# Patient Record
Sex: FEMALE | Race: BLACK OR AFRICAN AMERICAN | NOT HISPANIC OR LATINO | Employment: OTHER | URBAN - METROPOLITAN AREA
[De-identification: names, ages, dates, MRNs, and addresses within clinical notes are randomized per-mention and may not be internally consistent; named-entity substitution may affect disease eponyms.]

---

## 2017-01-24 ENCOUNTER — TELEPHONE (OUTPATIENT)
Dept: OBGYN | Facility: CLINIC | Age: 32
End: 2017-01-24

## 2017-01-24 ENCOUNTER — MEDICAL CORRESPONDENCE (OUTPATIENT)
Dept: HEALTH INFORMATION MANAGEMENT | Facility: CLINIC | Age: 32
End: 2017-01-24

## 2017-01-24 NOTE — TELEPHONE ENCOUNTER
Dr. Agbeh signed and faxed orders for home visits on 01/24/2017, to [facility] at # [phone].  Josseline Puri CMA

## 2018-03-09 ENCOUNTER — OFFICE VISIT (OUTPATIENT)
Dept: FAMILY MEDICINE | Facility: CLINIC | Age: 33
End: 2018-03-09
Payer: COMMERCIAL

## 2018-03-09 VITALS
SYSTOLIC BLOOD PRESSURE: 120 MMHG | OXYGEN SATURATION: 97 % | WEIGHT: 124.8 LBS | TEMPERATURE: 98.2 F | HEART RATE: 76 BPM | DIASTOLIC BLOOD PRESSURE: 87 MMHG | BODY MASS INDEX: 19.55 KG/M2

## 2018-03-09 DIAGNOSIS — F43.22 ADJUSTMENT DISORDER WITH ANXIOUS MOOD: Primary | ICD-10-CM

## 2018-03-09 DIAGNOSIS — K08.89 TOOTH PAIN: ICD-10-CM

## 2018-03-09 RX ORDER — IBUPROFEN 600 MG/1
600 TABLET, FILM COATED ORAL EVERY 6 HOURS PRN
Qty: 30 TABLET | Refills: 3 | Status: SHIPPED | OUTPATIENT
Start: 2018-03-09 | End: 2023-09-25

## 2018-03-09 ASSESSMENT — ENCOUNTER SYMPTOMS
SLEEP DISTURBANCE: 1
FEVER: 0
DYSPHORIC MOOD: 1
APPETITE CHANGE: 1
NERVOUS/ANXIOUS: 1

## 2018-03-09 NOTE — PATIENT INSTRUCTIONS
Here is the plan from today's visit    Adjustment disorder with anxious mood  We will call you to schedule behavioral health assessment.    We will get you Sugar's information.  - BEHAVIORAL HEALTH REFERRAL (formerly [facility] interal and external)      Please call or return to clinic if your symptoms don't go away.    Follow up plan: with Dr. Bucio in 2 weeks       Thank you for coming to [facility] today.  Lab Testing:  **If you had lab testing today and your results are reassuring or normal they will be mailed to you or sent through [employer] within 7 days.   **If the lab tests need quick action we will call you with the results.  The phone number we will call with results is # [phone] (home) . If this is not the best number please call our clinic and change the number.  Medication Refills:  If you need any refills please call your pharmacy and they will contact us.   If you need to  your refill at a new pharmacy, please contact the new pharmacy directly. The new pharmacy will help you get your medications transferred faster.   Scheduling:  If you have any concerns about today's visit or wish to schedule another appointment please call our office during normal business hours [phone] (8-5:00 M-F)  If a referral was made to a [facility] and you don't get a call from central scheduling please call [phone].  If a Mammogram was ordered for you at [facility] call [phone] to schedule or change your appointment.  If you had an XRay/CT/Ultrasound/MRI ordered the number is [phone] to schedule or change your radiology appointment.   Medical Concerns:  If you have urgent medical concerns please call [phone] at any time of the day.  If you have a medical emergency please call [phone].
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
## 2018-03-09 NOTE — MR AVS SNAPSHOT
After Visit Summary   3/9/2018    Chelsi Saleh    MRN: [id number]           Patient Information     Date Of Birth          1985        Visit Information        Provider Department      3/9/2018 2:20 PM Fuad Bucio,  [facility]        Today's Diagnoses     Adjustment disorder with anxious mood    -  1    Tooth pain          Care Instructions    Here is the plan from today's visit    Adjustment disorder with anxious mood  We will call you to schedule behavioral health assessment.    We will get you Sugar's information.  - BEHAVIORAL HEALTH REFERRAL ([facility]ospit[facility] interal and external)      Please call or return to clinic if your symptoms don't go away.    Follow up plan: with Dr. Bucio in 2 weeks       Thank you for coming to [facility] today.  Lab Testing:  **If you had lab testing today and your results are reassuring or normal they will be mailed to you or sent through [employer] within 7 days.   **If the lab tests need quick action we will call you with the results.  The phone number we will call with results is # [phone] (home) . If this is not the best number please call our clinic and change the number.  Medication Refills:  If you need any refills please call your pharmacy and they will contact us.   If you need to  your refill at a new pharmacy, please contact the new pharmacy directly. The new pharmacy will help you get your medications transferred faster.   Scheduling:  If you have any concerns about today's visit or wish to schedule another appointment please call our office during normal business hours [phone] (8-5:00 M-F)  If a referral was made to a [facility] and you don't get a call from central scheduling please call [phone].  If a Mammogram was ordered for you at [facility] call [phone] to schedule or change your appointment.  If you had an XRay/CT/Ultrasound/MRI ordered the number is  [phone] to schedule or change your radiology appointment.   Medical Concerns:  If you have urgent medical concerns please call [phone] at any time of the day.  If you have a medical emergency please call 911.            Follow-ups after your visit        Additional Services     BEHAVIORAL HEALTH REFERRAL ([facility] interal and external)       Referring MD: DONNY YODER    May leave message on voicemail: Yes                  Who to contact     Please call your clinic at [phone] to:    Ask questions about your health    Make or cancel appointments    Discuss your medicines    Learn about your test results    Speak to your doctor            Additional Information About Your Visit        [employer] Information     [employer] is an electronic gateway that provides easy, online access to your medical records. With [employer], you can request a clinic appointment, read your test results, renew a prescription or communicate with your care team.     To sign up for [employer] visit the website at [email or web address]   You will be asked to enter the access code listed below, as well as some personal information. Please follow the directions to create your username and password.     Your access code is: [email or web address]  Expires: 2018  3:03 PM     Your access code will  in 90 days. If you need help or a new code, please contact your [facility] or call [phone] for assistance.        Care EveryWhere ID     This is your Care EveryWhere ID. This could be used by other organizations to access your [place] medical records  [id number]        Your Vitals Were     Pulse Temperature Pulse Oximetry Breastfeeding? BMI (Body Mass Index)       76 98.2  F (36.8  C) (Oral) 97% No 19.55 kg/m2        Blood Pressure from Last 3 Encounters:   18 120/87   16 105/77   10/19/16 114/85    Weight from Last 3 Encounters:   18 124 lb 12.8 oz (56.6 kg)   16 132 lb  (59.9 kg)   10/19/16 129 lb (58.5 kg)              We Performed the Following     BEHAVIORAL HEALTH REFERRAL ([facility] interal and external)          Today's Medication Changes          These changes are accurate as of 3/9/18  3:08 PM.  If you have any questions, ask your nurse or doctor.               These medicines have changed or have updated prescriptions.        Dose/Directions    * IBUPROFEN PO   This may have changed:  Another medication with the same name was added. Make sure you understand how and when to take each.   Changed by:  Fuad Bucio DO        Dose:  600 mg   Take 600 mg by mouth every 6 hours as needed for moderate pain   Refills:  0       * ibuprofen 600 MG tablet   Commonly known as:  ADVIL/MOTRIN   This may have changed:  You were already taking a medication with the same name, and this prescription was added. Make sure you understand how and when to take each.   Used for:  Tooth pain   Changed by:  Fuad Bucio DO        Dose:  600 mg   Take 1 tablet (600 mg) by mouth every 6 hours as needed for moderate pain   Quantity:  30 tablet   Refills:  3       * Notice:  This list has 2 medication(s) that are the same as other medications prescribed for you. Read the directions carefully, and ask your doctor or other care provider to review them with you.         Where to get your medicines      These medications were sent to [facility] [place], [place] - [address] [facility] [address]     Phone:  [phone]     ibuprofen 600 MG tablet                Primary Care Provider Fax #    Physician No Ref-Primary [phone]       No address on file        Equal Access to Services     [facility] Hospital[facility]TON[facility] : Hadii flavio zamorao Sokarlie, waaxda luqadaha, qaybta kaalmada adecody, stacy vela . So [facility] [phone].    ATENCIÓN: Si habla español, tiene a bonner disposición servicios gratuitos de asistencia lingüística. Llame  al [phone].    We comply with applicable federal civil rights laws and [place] laws. We do not discriminate on the basis of race, color, national origin, age, disability, sex, sexual orientation, or gender identity.            Thank you!     Thank you for choosing [facility]  for your care. Our goal is always to provide you with excellent care. Hearing back from our patients is one way we can continue to improve our services. Please take a few minutes to complete the written survey that you may receive in the mail after your visit with us. Thank you!             Your Updated Medication List - Protect others around you: Learn how to safely use, store and throw away your medicines at www.disposemymeds.org.          This list is accurate as of 3/9/18  3:08 PM.  Always use your most recent med list.                   Brand Name Dispense Instructions for use Diagnosis    acetaminophen-codeine 300-30 MG per tablet    TYLENOL #3     Take 1-2 tablets by mouth every 4 hours as needed for moderate pain        AMOXICILLIN PO      Take 500 mg by mouth 3 times daily        * IBUPROFEN PO      Take 600 mg by mouth every 6 hours as needed for moderate pain        * ibuprofen 600 MG tablet    ADVIL/MOTRIN    30 tablet    Take 1 tablet (600 mg) by mouth every 6 hours as needed for moderate pain    Tooth pain       * Notice:  This list has 2 medication(s) that are the same as other medications prescribed for you. Read the directions carefully, and ask your doctor or other care provider to review them with you.

## 2018-03-09 NOTE — PROGRESS NOTES
Preceptor Attestation:   Patient seen and discussed with the resident. Assessment and plan reviewed with resident and agreed upon.   Supervising Physician:  Phillip Malin MD  [facility]
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
## 2018-03-13 ENCOUNTER — TELEPHONE (OUTPATIENT)
Dept: PSYCHOLOGY | Facility: CLINIC | Age: 33
End: 2018-03-13

## 2018-03-13 NOTE — TELEPHONE ENCOUNTER
Mental Health Referral:  Please schedule with Dr. Doe.      If you are unable to reach the patient after two phone attempts, please send a letter and close the encounter.      Thank you!      Nathaniel Lombardi, PhD,   Behavioral Health Fellow

## 2018-03-16 NOTE — TELEPHONE ENCOUNTER
It looks like this patient was referred to [facility] as well.  Thank you

## 2018-03-16 NOTE — PROGRESS NOTES
Preceptor Attestation:  I have reviewed and agree with the behavioral health fellow's documentation for this visit.  I did not see the patient.  Supervising Clinical Psychologist:  Dulce Dorsey PSYD LP

## 2018-03-22 ENCOUNTER — DOCUMENTATION ONLY (OUTPATIENT)
Dept: FAMILY MEDICINE | Facility: CLINIC | Age: 33
End: 2018-03-22

## 2018-03-22 NOTE — PROGRESS NOTES
Behavioral Health Home Services  No Data Recorded      Social Work Care Navigator Note      Patient: Chelsi Saleh  Date: March 22, 2018  Preferred Name: Chelsi    Previous PHQ-9: No flowsheet data found.  Previous STEVEN-7: No flowsheet data found.  SANDRA LEVEL:  No flowsheet data found.    Preferred Contact:  No Data Recorded    Type of Contact Today: Phone call (not reached/unavailable)      Data: (subjective / Objective):  Referral for [facility] services, will contact patient upon completion of DA and meeting eligibility criteria for the [facility] service.  Attempted to reach patient, but was unsuccessful.  Plan to attempt again.  JULIENNE Chang [facility] SW CC

## 2018-03-28 ENCOUNTER — CARE COORDINATION (OUTPATIENT)
Dept: FAMILY MEDICINE | Facility: CLINIC | Age: 33
End: 2018-03-28

## 2018-03-28 ENCOUNTER — OFFICE VISIT (OUTPATIENT)
Dept: PSYCHOLOGY | Facility: CLINIC | Age: 33
End: 2018-03-28
Payer: COMMERCIAL

## 2018-03-28 DIAGNOSIS — F32.A DEPRESSION, UNSPECIFIED DEPRESSION TYPE: Primary | ICD-10-CM
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
## 2018-03-28 NOTE — PROGRESS NOTES
"Behavioral Health Progress Note    Client Legal Name: Chelsi Saleh   Client Preferred Name: Chelsi   Service Type: Crisis Psychotherapy Visit  Length of Visit: 90 minutes  Attendees: Chelsi's 18 month old son-Jerrell, and Community Health Worker (who also assisted with some interpretation at times, Sugarleyla Saleh.    Complexity: An  is used not only to interpret language, since the patient does not speak English, but also to help with the complexity of understandings across cultures, since the patient is not well integrated in the larger [place] culture.    Identifying Information and Presenting Problem:  The patient is a 32 year old [place] female who is being seen for problematic symptoms of anxiety.    Crisis Intervention:  Threat to Safety: Chelsi came into the session and reported that her ex-, his new wife, and a girl friend of his have been calling her and leaving voicemails that they are going to find her and kill her and her son. Chelsi stated that she did not feel safe and she felt that her life and her son's life were in danger. She stated that she has been getting threats for months, but more recently they have been asking for her address, which scared her. Chelsi stated that she has not given them her address, but she fears they will find her due to how small the [place] community is. Chelsi reported that she and her ex were never legally  and that they were \"culturally .\" She reported that they are mad at her because her ex pays child support and they no longer want to.    Current Living: Korey is currently living in a government assisted apartment with her son. She stated that her ex has never been there and he currently does not know where she lives.    Police Report: Due to the nature of the threats against Chelsi and her son, I consulted [facility]alt[facility] Gonz[facility]les, Clinic , to assist with the case. Ms. Gonzáles called the police to ask that they come to the clinic and take a " statement from Chelsi. After an hour, the police arrived and talked with the patient in my presence. The [place]  who took Chelsi's statement was Andres Nguyen badge number [id number]. Through clarification, Chelsi reported that her ex was living in [place]. Officer Patrick reported that, since her ex was not in state, the threat was not imminent and that he could not do much. Officer Patrick recommended that Chelsi 1) Block her ex's and his partner's phone numbers and 2) Chelsi go to the [facility] to speak with an advocate to file an order of protection.    Women's Shelters: Before learning that Chelsi's ex was in [place], she had stated that he was in the [facility] and that she was concerned he would find her. Therefore, she requested information on shelters and I contacted multiple shelters to see if they had capacity to take Chelsi and her son. I first talked with the [facility] shelter at [phone] and they informed me that they did not have any space, but a shelter in [place] did. I called the shelter in [place] at [phone] and they reported that they actually did not have the space. Therefore, I called [facility] in [place] and they stated that they were full and to call the [place] shelter. I informed [facility] that [place] was actually full. I then called the [facility] at [phone] and Chelsi took the phone and talked with them. Chelsi was mid conversation with them when officer Patrick arrived. Chelsi asked to call them back. Following her discussion with Officer Patrick, she declined wanting to go to a shelter today, as her ex was not in state.    Mental Status: Chelsi appeared generally alert and oriented. Dress was casual and appropriate to the weather and occasion. Grooming and hygiene were good. Eye contact was good. Speech was of normal volume and rate and was clear, coherent, and relevant. Mood was anxious with congruent affect. She  appeared to be in quite a bit of distress; however, at the end of the appointment she appeared much calmer, which was consistent with her verbal reports. Thought processes were relevant, logical and goal-directed. Thought content was WNL with no evidence of psychotic or paranoid features. No evidence of SI/HI or self-harm, intent, or plans. Memory appeared grossly intact. Insight and judgment appeared adequate and patient exhibited good impulse control during the appointment.     Does the patient appear to be at imminent risk of harm to self/others at this time? No    The session was necessary to address Chelsi's current crisis of safety that has been causing her significant distress and ability to function. Ongoing psychotherapy is necessary to provide counseling and provide support.    Diagnosis (DSM-5):  Unspecified Depression    Plan:  1. Please see Patient Instructions for specific plan developed with Chelsi. Her plan is to go the [facility], ask to see an advocate to file an order of protection, and then file an order of protection. Then, if/when her  or his partners contact Chelsi after the order of protection is filed, she is to call the police to notify them of their contact. Chelsi was also given the list of all the shelters for her to contact if she feels going to the shelter is needed. I typed up, printed, and reviewed the plan in-depth with Chelsi and highlighted many important parts. She verbalized understanding and the rationale for the plan. She denied having any questions.  2. Chelsi to follow up in 1 to 2 weeks. I will place a call between now and our next appointment to check in with Chelsi.  3. I consulted my supervisor Dr. Dorsey during and after my visit with Chelsi for the purpose of care coordination.  4. Both Sugar Saleh and Katlin Gonzáles were involved in today's visit and were consulted during and after for the purpose of care coordination.

## 2018-03-28 NOTE — PATIENT INSTRUCTIONS
Go to [facility]  [address]  [facility]  [phone]    Request to talk to an advocate to file an Order of Protection    - Bring evidence of the threats    Officer from today  - Andres Mora [id number]      Step 1: File Order of Protection  Step 2: If ex- and wife contact you after that order if filed, you notify police of this contact        If feel unsafe in home:    Call [facility] Crisis Line [phone]    Other options:  The National Domestic Violence Hotline  7-288-919-SAFE  www.[employer]    [facility]    Services offered:    24 hour shelter and crisis lines    Orders for Protection    Safety Planning    Assistance with: healthcare, job placement, childcare, housing    Transportation to the shelter    846.478.3785 or 1-422.877.2191  www.[employer].org    [facility]     For women, men, youth and families who have experienced relationship violence, elder abuse, addiction, sexual exploitation or other forms of trauma. Safe shelter, legal services, mental and chemical health counseling, elder abuse resources, youth programming and community education.    [facility] Crisis line: [phone]  (several locations):    [facility]  [address] [place], [place] [address] [phone]    [facility]  [address] [place], [place] [address] [phone]    [facility]   [address] [place] [address]  [phone]    [place] [facility]  [address]  [place] [address]  Hotline: 1 (394) 514-9789  Office: [phone] Fax: [phone]  Website: www.[facility].org    [place] [facility] - (Domestic violence, sexual assault, human trafficking, youth and community advocacy services)  A centralized referral source to connect survivors with services/agencies in their geographic region.     763.678.2420 or  0-919-748-5232  [facility]org      [facility]  Youth and adult safety planning, advocacy, rape and sexual abuse services, senior women's advocacy, legal systems advocacy, support groups, parenting support, housing resources, help with restraining orders, housing resources    [address] [address]  Office: [phone]  24-Hour Crisis Line: [phone]    [facility]  [address] [place] [address]  Office: [phone]  24-Hour Crisis Line: [phone]    [facility]  [address] [place] [address]  Office: [phone]  Office Fax: [phone]  24-Hour Crisis Line: [phone]    Order for Protection Filing  [facility]: [phone]  [facility]: [phone]    Sexual Offense Services  [facility]: [phone]  [facility]: [phone]      Referral (per [facility]  [facility]: [phone]  [facility]: [phone]    [facility]  [phone]    [facility] Toll Free Crisis Line  [phone]

## 2018-03-28 NOTE — MR AVS SNAPSHOT
After Visit Summary   3/28/2018    Chelsi Saleh    MRN: [id number]           Patient Information     Date Of Birth          1985        Visit Information        Provider Department      3/28/2018 10:20 AM Gwen Doe, PhD [facility]        Today's Diagnoses     Depression, unspecified depression type    -  1      Care Instructions    Go to [facility]  [address]  [facility]  [phone]    Request to talk to an advocate to file an Order of Protection    - Bring evidence of the threats    Officer from today  - Andres Mora 5387      Step 1: File Order of Protection  Step 2: If ex- and wife contact you after that order if filed, you notify police of this contact        If feel unsafe in home:    Call [facility] Crisis Line [phone]    Other options:  The National Domestic Violence Hotline  7-577-585-WSOL  www.[employer]    [facility] [employer]    Services offered:    24 hour shelter and crisis lines    Orders for Protection    Safety Planning    Assistance with: healthcare, job placement, childcare, housing    Transportation to the shelter    841.155.6062 or 1-625.705.1324  www.[employer].org    [facility]     For women, men, youth and families who have experienced relationship violence, elder abuse, addiction, sexual exploitation or other forms of trauma. Safe shelter, legal services, mental and chemical health counseling, elder abuse resources, youth programming and community education.    [facility] Crisis line: [phone]  (several locations):    [facility]  [address] [place] [address]  [phone]    [facility]  [address] [place] [address]  [phone]    [facility]   [address] [place] [address]  [phone]    [place] [facility]  [address]  [place] [address]  Hotline: 1 (716) 306-1710  Office: [phone] Fax: [phone]  Website: www.[facility].org    [place] [facility] - (Domestic violence, sexual assault, human trafficking, youth and community advocacy services)  A centralized referral source to connect survivors with services/agencies in their geographic region.     822.763.4194 or 1-926.209.9985  [facility].org      [facility]  Youth and adult safety planning, advocacy, rape and sexual abuse services, senior women's advocacy, legal systems advocacy, support groups, parenting support, housing resources, help with restraining orders, housing resources    [address] [address]  Office: [phone]  24-Hour Crisis Line: [phone]    [facility]  [address] [place] [address]  Office: [phone]  24-Hour Crisis Line: [phone]    [facility]  [address] [place] [address]  Office: [phone]  Office Fax: [phone]  24-Hour Crisis Line: [phone]    Order for Protection Filing  [facility]: [phone]  [facility]: [phone]    Sexual Offense Services  [facility]: [phone]  [facility]: [phone]      Referral (per [facility] [employer]  [facility]: [phone]  [facility]: [phone]    [facility]  [phone]    [facility] Toll Free Crisis Line  [phone]              Follow-ups after your visit        Follow-up notes from your care team     Return in about 1 week (around 4/4/2018).      Who to contact     Please call your clinic at [phone] to:    Ask questions about your health    Make or cancel appointments    Discuss your medicines    Learn about your test results    Speak to your doctor            Additional Information About Your Visit        MyChart Information     Open mHealth is an electronic gateway that provides easy, online access to your medical  records. With ColorChip, you can request a clinic appointment, read your test results, renew a prescription or communicate with your care team.     To sign up for ColorChip visit the website at [email or web address]   You will be asked to enter the access code listed below, as well as some personal information. Please follow the directions to create your username and password.     Your access code is: [email or web address]  Expires: 2018  4:03 PM     Your access code will  in 90 days. If you need help or a new code, please contact your [facility] or call [phone] for assistance.        Care EveryWhere ID     This is your Care EveryWhere ID. This could be used by other organizations to access your [place] medical records  [id number]         Blood Pressure from Last 3 Encounters:   18 120/87   16 105/77   10/19/16 114/85    Weight from Last 3 Encounters:   18 124 lb 12.8 oz (56.6 kg)   16 132 lb (59.9 kg)   10/19/16 129 lb (58.5 kg)              We Performed the Following     Crisis Psychotherapy, each additional 30 min (03993)     Interactive Complexity add-on (00641)        Primary Care Provider Fax #    Physician No Ref-Primary [phone]       No address on file        Equal Access to Services     GINGER WEISS : Hadii flavio vila hadasho Sokarlie, waaxda luqadaha, qaybta kaalmada adecody, stacy radford. So [facility] [phone].    ATENCIÓN: Si habla español, tiene a bonner disposición servicios gratuitos de asistencia lingüística. Llame al [phone].    We comply with applicable federal civil rights laws and [place] laws. We do not discriminate on the basis of race, color, national origin, age, disability, sex, sexual orientation, or gender identity.            Thank you!     Thank you for choosing hospitals [facility]  for your care. Our goal is always to provide you with excellent care. Hearing back from our  patients is one way we can continue to improve our services. Please take a few minutes to complete the written survey that you may receive in the mail after your visit with us. Thank you!             Your Updated Medication List - Protect others around you: Learn how to safely use, store and throw away your medicines at www.disposemymeds.org.          This list is accurate as of 3/28/18 11:59 PM.  Always use your most recent med list.                   Brand Name Dispense Instructions for use Diagnosis    * acetaminophen-codeine 300-30 MG per tablet    TYLENOL #3     Take 1-2 tablets by mouth every 4 hours as needed for moderate pain        * acetaminophen-codeine 300-30 MG per tablet    TYLENOL #3    10 tablet    Take 1 tablet by mouth every 6 hours as needed for pain maximum 4 tablet(s) per day    Tooth pain       AMOXICILLIN PO      Take 500 mg by mouth 3 times daily        * IBUPROFEN PO      Take 600 mg by mouth every 6 hours as needed for moderate pain        * ibuprofen 600 MG tablet    ADVIL/MOTRIN    30 tablet    Take 1 tablet (600 mg) by mouth every 6 hours as needed for moderate pain    Tooth pain       * Notice:  This list has 4 medication(s) that are the same as other medications prescribed for you. Read the directions carefully, and ask your doctor or other care provider to review them with you.

## 2018-03-30 NOTE — PROGRESS NOTES
Preceptor Attestation:  Dr. Doe consulted with me in clinic while she was seeing this patient to assist with the plan.  Additionally, I have reviewed and agree with the behavioral health fellow's documentation for this visit.  I did not see the patient.  Supervising Clinical Psychologist:  Dulce Dorsey PSYD LP

## 2018-04-06 ENCOUNTER — TELEPHONE (OUTPATIENT)
Dept: FAMILY MEDICINE | Facility: CLINIC | Age: 33
End: 2018-04-06

## 2018-04-06 NOTE — LETTER
04/06/2018    RE: Chelsi Saleh  [phone]   [address] [address]   [place] [address]       Dear Chelsi Saleh,    I hope that this letter finds you well. I am writing to you as I wanted to follow up with you since our last appointment. I attempted to contact you via telephone; however, I received a message that your phone number was no longer in service. Also, it appears that you do not have any follow up appointments scheduled at [facility]. During your last visit with me, you discussed interest in continuing in counseling. However, I recognize that needs may change at different times and many different factors can influence your interest or desire to receive counseling services. If and when you feel that you are ready to participate in counseling, I would encourage you to contact me at the clinic. If there are any barriers preventing you from attending your appointments, please let me know, as I would be happy to work with you in eliminating those barriers. Also, I have included below some resources in the community for you to utilize if needed. Regarding medical appointments, please continue to follow up with Dr. Bucio, as we want to support you in anyway we can in living a healthy life!    I look forward to hearing from you and wish you the best of luck in the future. If you have any questions, please call me at the clinic [phone].    Sincerely,  Gwen Doe, PhD  Behavioral Health Fellow    If you have thoughts about self harm or if you just need additional support and care, here are some resources for you:    Crisis Numbers   Crisis Connection - [phone]   COPE - [phone] ([facility] Mobile Response Team)    [facility] [phone] (Emergency Psychiatric Evaluation)     Or call 537

## 2018-04-06 NOTE — TELEPHONE ENCOUNTER
Attempted to contact Chelsi to follow up from last week's visit. Chelsi's phone was no longer in service. I will consult with Sugar Saleh, our community health worker who was involved with last week's visit. Further, I will send a letter regarding attempt to contact. Chelsi does not have any follow up scheduled with any provider at [facility] at this time.     Gwen Doe, Ph.D.  Behavioral Health Fellow

## 2018-05-15 NOTE — PROGRESS NOTES
Behavioral Health Home Services  [facility]: [place]      Social Work Care Navigator Note      Patient: Chelsi Saleh  Date: May 15, 2018  Preferred Name: Chelsi    Previous PHQ-9: No flowsheet data found.  Previous STEVEN-7: No flowsheet data found.  SANDRA LEVEL:  No flowsheet data found.    Preferred Contact:  No Data Recorded    Type of Contact Today: Phone call (not reached/unavailable)      Data: (subjective / Objective):  Attempted to reach patient, but was unsuccessful.  Plan to attempt again.  Sandy Zambrano

## 2018-05-20 ENCOUNTER — HEALTH MAINTENANCE LETTER (OUTPATIENT)
Age: 33
End: 2018-05-20

## 2018-06-01 NOTE — PROGRESS NOTES
Behavioral Health Home Services  [facility]: [place]      Social Work Care Navigator Note      Patient: Chelsi Saleh  Date: June 1, 2018  Preferred Name: Chelsi    Previous PHQ-9: No flowsheet data found.  Previous STEVEN-7: No flowsheet data found.  SANDRA LEVEL:  No flowsheet data found.    Preferred Contact:  No Data Recorded    Type of Contact Today: Phone call (not reached/unavailable)      Data: (subjective / Objective):  Attempted to reach patient, but was unsuccessful.  Plan to attempt again.  Sandy Zambrano

## 2018-06-13 ENCOUNTER — OFFICE VISIT (OUTPATIENT)
Dept: PSYCHOLOGY | Facility: CLINIC | Age: 33
End: 2018-06-13
Payer: COMMERCIAL

## 2018-06-13 DIAGNOSIS — F32.A DEPRESSION, UNSPECIFIED DEPRESSION TYPE: Primary | ICD-10-CM

## 2018-06-13 NOTE — Clinical Note
Tiffanie Wooten,   Will you read my note from yesterday about this patient and let me know your thoughts? I want to make sure we are providing all resources we can to help her! She has been so strong and I want to provide the best support.  Thanks! Gwen

## 2018-06-13 NOTE — PROGRESS NOTES
Behavioral Health Progress Note    Client Legal Name: Chelsi Saleh   Client Preferred Name: Chelsi   Service Type: Individual  Length of Visit: 40 minutes  Attendees: Son- Xenia    Identifying Information and Presenting Problem:  The patient is a 32 year old Belgian female who is being seen for problematic symptoms of anxiety.    Treatment Objective(s) Addressed in This Session:  Follow Up on Safety: Obtain updates on safety to self and son and provide any resources/assistance needed.    Progress on / Status of Treatment Objective(s) / Homework:  Satisfactory progress     Topics Discussed/Interventions Provided:  Updates: Chelsi was last seen by me on 3/28/2018 and was in crisis, see note in EMR. She stated that she followed through with recommendations and went to [facility], filed a restraining order against her ex- and his new wife, and a  ruled in Chelsi's favor. Since the restraining order was placed, her ex- and his wife have not contacted her and they are still living in [place]. She also changed her phone number and has not given it out to those still in contact with her ex-. Chelsi stated that she is currently still living in her subsidized apartment, which her ex- does not know the address. Chelsi stated that she has been feeling safe, resulting in her mood and anxiety drastically improving. She denied experiencing any current distress. I provided feedback to Chelsi about her strength and effort to go through this process. Chelsi stated that Sugar Saleh, DEEPW, and I were very helpful, as well as the police at the [facility].     Housing: Chelsi reported that she has some concerns about housing due to her income. She is not working right now and obtains $500 a month in child support from her ex-. Specifically, the [employer] takes money out of his check and then deposits it in her account; therefore, he is not mailing her a check. For the past  several months, subsidized housing has been paying half of her rent ($400) and then she uses child support money to pay the other half ($400). She uses the $100 left over each month to pay her electric bill, phone bill, and for diapers. She has SNAP benefits for food. However, Chelsi was recently informed that subsidized housing is decreasing how much they will pay and starting next month she will owe $690 for rent, which is $190 above her monthly income.  She stated that subsidized housing wants her to get a job to pay the difference, which Chelsi is willing to do, but she needs to be approved for day care benefits first. She applied for day care benefits May 16th, but has not heard anything back yet. I encouraged Chelsi to call the day care benefits program and ask the status of her application. I also encouraged her to call subsidized housing and inform her of her plan and see if they can be flexible with the date in which they decrease how much they will pay her rent.    Work: While waiting for status on day care benefits, Chelsi plans to obtain job applications. She stated that she does not know how to fill out an application; therefore, I informed her that I would be happy to help her. I encouraged her to schedule follow up with me in the next week and bring the applications to session and we can complete them together. Chelsi thanked me and plans to do that.    After today's visit, I followed up with GENNA Mccarthy regarding housing benefits. Ms. Eliecer reported that, since her new rent cost is due in two weeks, we may need to consider a long-term women's shelter that allows children until Chelsi can obtain  benefits and start work. Ms. Gonzáles provided me with the website to connect patients to shelters. I will discuss this with Chelsi at our next visit. Also, Sugar Saleh has been involved with Chelsi's care. I will route a message to Sugar Saleh about today's discussion to see what other resources are  available.    Assessment: The patient appeared to be active and engaged in today's session and was receptive to feedback.     Mental Status: Chelsi appeared generally alert and oriented. Dress was casual and appropriate to the weather and occasion. Grooming and hygiene were good. Eye contact was good. Speech was of normal volume and rate and was clear, coherent, and relevant. Mood was euthymic with congruent affect. Thought processes were relevant, logical and goal-directed. Thought content was WNL with no evidence of psychotic or paranoid features. No evidence of SI/HI or self-harm, intent, or plans. Memory appeared grossly intact. Insight and judgment appeared good and patient exhibited god impulse control during the appointment.     Does the patient appear to be at imminent risk of harm to self/others at this time? No    The session was necessary to address anxiety and depression that had been interfering with patient's ability to function.  Ongoing psychotherapy may be necessary to provide counseling, provide psychoeducation and provide support.    Diagnosis (DSM-5):  311 (F32.9) Unspecified Depression    Plan:  1. Follow up in one week to complete DA and review job applications. She plans to call in and schedule appointment.  2. Chelsi to call  benefits and subsidized housing.   3. Chelsi to utilize crisis resources as needed.      NOTE: Due to Chelsi being in a crisis state at last visit, a DA was not completed. Today was designated for follow up, to ensure Chelsi was safe and not in crisis anymore. DA will be completed at next visit.

## 2018-06-13 NOTE — MR AVS SNAPSHOT
After Visit Summary   2018    Chelsi Saleh    MRN: [id number]           Patient Information     Date Of Birth          1985        Visit Information        Provider Department      2018 8:20 AM Gwen Doe, PhD [facility]        Today's Diagnoses     Depression, unspecified depression type    -  1       Follow-ups after your visit        Follow-up notes from your care team     Return in about 1 week (around 2018).      Who to contact     Please call your clinic at [phone] to:    Ask questions about your health    Make or cancel appointments    Discuss your medicines    Learn about your test results    Speak to your doctor            Additional Information About Your Visit        MyChart Information     [employer] is an electronic gateway that provides easy, online access to your medical records. With Splasht, you can request a clinic appointment, read your test results, renew a prescription or communicate with your care team.     To sign up for Splasht visit the website at [email or web address]   You will be asked to enter the access code listed below, as well as some personal information. Please follow the directions to create your username and password.     Your access code is: [email or web address]  Expires: 2018 11:26 AM     Your access code will  in 90 days. If you need help or a new code, please contact your [facility] or call [phone] for assistance.        Care EveryWhere ID     This is your Care EveryWhere ID. This could be used by other organizations to access your [place] medical records  [id number]         Blood Pressure from Last 3 Encounters:   18 120/87   16 105/77   10/19/16 114/85    Weight from Last 3 Encounters:   18 124 lb 12.8 oz (56.6 kg)   16 132 lb (59.9 kg)   10/19/16 129 lb (58.5 kg)              Today, you had the following     No orders found for display        Primary Care Provider Office Phone # Fax #    Dominique Kendall [phone]       [address] [address]        Equal Access to Services     GINGER WEISS : Lauren flavio vila lui Powers, waaxda luqadaha, qaybta kaalmada ravindra, stacy west juanitabraulio carmona dane radford. So [facility] [phone].    ATENCIÓN: Si habla español, tiene a bonner disposición servicios gratuitos de asistencia lingüística. Tori al [phone].    We comply with applicable federal civil rights laws and [place] laws. We do not discriminate on the basis of race, color, national origin, age, disability, sex, sexual orientation, or gender identity.            Thank you!     Thank you for choosing [facility]  for your care. Our goal is always to provide you with excellent care. Hearing back from our patients is one way we can continue to improve our services. Please take a few minutes to complete the written survey that you may receive in the mail after your visit with us. Thank you!             Your Updated Medication List - Protect others around you: Learn how to safely use, store and throw away your medicines at www.disposemymeds.org.          This list is accurate as of 6/13/18 11:59 PM.  Always use your most recent med list.                   Brand Name Dispense Instructions for use Diagnosis    * acetaminophen-codeine 300-30 MG per tablet    TYLENOL #3     Take 1-2 tablets by mouth every 4 hours as needed for moderate pain        * acetaminophen-codeine 300-30 MG per tablet    TYLENOL #3    10 tablet    Take 1 tablet by mouth every 6 hours as needed for pain maximum 4 tablet(s) per day    Tooth pain       AMOXICILLIN PO      Take 500 mg by mouth 3 times daily        * IBUPROFEN PO      Take 600 mg by mouth every 6 hours as needed for moderate pain        * ibuprofen 600 MG tablet    ADVIL/MOTRIN    30 tablet    Take 1 tablet (600 mg) by mouth every 6 hours as needed for  moderate pain    Tooth pain       * Notice:  This list has 4 medication(s) that are the same as other medications prescribed for you. Read the directions carefully, and ask your doctor or other care provider to review them with you.

## 2018-06-14 PROBLEM — F32.A DEPRESSION, UNSPECIFIED DEPRESSION TYPE: Status: ACTIVE | Noted: 2018-06-14

## 2018-10-29 ENCOUNTER — OFFICE VISIT (OUTPATIENT)
Dept: FAMILY MEDICINE | Facility: CLINIC | Age: 33
End: 2018-10-29
Payer: COMMERCIAL

## 2018-10-29 VITALS
BODY MASS INDEX: 19.42 KG/M2 | DIASTOLIC BLOOD PRESSURE: 79 MMHG | HEART RATE: 79 BPM | SYSTOLIC BLOOD PRESSURE: 130 MMHG | TEMPERATURE: 98.2 F | WEIGHT: 124 LBS | OXYGEN SATURATION: 100 %

## 2018-10-29 DIAGNOSIS — R35.0 URINARY FREQUENCY: ICD-10-CM

## 2018-10-29 DIAGNOSIS — S39.012A STRAIN OF LUMBAR REGION, INITIAL ENCOUNTER: Primary | ICD-10-CM

## 2018-10-29 LAB
BACTERIA: NORMAL
BILIRUBIN UR: NEGATIVE
BLOOD UR: ABNORMAL
CASTS: NORMAL /LPF
CRYSTAL URINE: NORMAL /LPF
EPITHELIAL CELLS UR: NORMAL /LPF (ref 0–2)
GLUCOSE URINE: NEGATIVE
KETONES UR QL: NEGATIVE
LEUKOCYTE ESTERASE UR: ABNORMAL
MUCOUS URINE: NORMAL LPF
NITRITE UR QL STRIP: NEGATIVE
PH UR STRIP: 5.5 [PH] (ref 5–7)
PROTEIN UR: NEGATIVE
RBC URINE: NORMAL /HPF
SP GR UR STRIP: 1.02
UROBILINOGEN UR STRIP-ACNC: ABNORMAL
WBC URINE: NORMAL /HPF

## 2018-10-29 ASSESSMENT — ENCOUNTER SYMPTOMS
DIARRHEA: 0
DIFFICULTY URINATING: 0
ACTIVITY CHANGE: 0
POLYPHAGIA: 0
FATIGUE: 0
FEVER: 0
APPETITE CHANGE: 0
RHINORRHEA: 0
NAUSEA: 0
DIZZINESS: 0
FREQUENCY: 1
DYSPHORIC MOOD: 0
ABDOMINAL PAIN: 0
UNEXPECTED WEIGHT CHANGE: 0
DYSURIA: 0
BACK PAIN: 1
COUGH: 0
HEMATURIA: 0

## 2018-10-29 NOTE — PROGRESS NOTES
HPI       Chelsi Saleh is a 33 year old  who presents for   Chief Complaint   Patient presents with     Back Pain     low back pain X 2 wks     Urinary Problem     frequency     Back pain:   Chelsi reports that she has had low back pain for the past 2 weeks, no inciting event. Pain has been worsening since onset. Worsened by bending and lifting. 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 Has tried heat with minimal relief. Denies incontinence, denies fevers. No history of malignancy.     Urinary frequency:  Patient reports 2 weeks of increased frequency of urination, worse at night. Denies any dysuria, hematuria, increased thirst or increased hunger. Has never had similar in the past. LMP was last week, patient reports that she is not sexually active. Drinks caffeine tea throughout the day.    Problem, Medication and Allergy Lists were reviewed and updated if needed..    Patient is an established patient of this clinic..         Review of Systems:   Review of Systems   Constitutional: Negative for activity change, appetite change, fatigue, fever and unexpected weight change.   HENT: Negative for congestion and rhinorrhea.    Respiratory: Negative for cough.    Gastrointestinal: Negative for abdominal pain, diarrhea and nausea.   Endocrine: Negative for polyphagia and polyuria.   Genitourinary: Positive for frequency. Negative for difficulty urinating, dysuria, hematuria and menstrual problem.   Musculoskeletal: Positive for back pain (low).   Neurological: Negative for dizziness.   Psychiatric/Behavioral: Negative for dysphoric mood.            Physical Exam:     Vitals:    10/29/18 1346   BP: 130/79   Pulse: 79   Temp: 98.2  F (36.8  C)   TempSrc: Oral   SpO2: 100%   Weight: 124 lb (56.2 kg)     Body mass index is 19.42 kg/(m^2).  Vitals were reviewed and were normal     Physical Exam   Constitutional: She is oriented to person, place, and time. She appears  well-developed and well-nourished.   HENT:   Head: Normocephalic and atraumatic.   Eyes: EOM are normal. Pupils are equal, round, and reactive to light.   Cardiovascular: Normal rate and regular rhythm.    No murmur heard.  Pulmonary/Chest: Effort normal and breath sounds normal. No respiratory distress.   Abdominal: Soft. Bowel sounds are normal. She exhibits no distension. There is no tenderness.   Musculoskeletal:        Lumbar back: She exhibits tenderness (paraspinal) and pain (worse with bending and twisting). She exhibits no bony tenderness, no swelling, no edema, no deformity and no laceration.   Negative for CVA tenderness. Negative straight leg raise.    Neurological: She is alert and oriented to person, place, and time.   Skin: Skin is warm and dry.   Psychiatric: She has a normal mood and affect.   Nursing note and vitals reviewed.        Results:      Results from this visit  Results for orders placed or performed in visit on 10/29/18   Urinalysis, Micro If (UA) ([facility])   Result Value Ref Range    Specific Gravity Urine 1.020 1.005 - 1.030    pH Urine 5.5 4.5 - 8.0    Leukocyte Esterase UR 1+ (A) NEGATIVE    Nitrite Urine Negative NEGATIVE    Protein UR Negative NEGATIVE    Glucose Urine Negative NEGATIVE    Ketones Urine Negative NEGATIVE    Urobilinogen mg/dL 1.0 E.U./dL 0.2 E.U./dL    Bilirubin UR Negative NEGATIVE    Blood UR Trace-lysed (A) NEGATIVE   Urine Microscopic (UA) ([facility])   Result Value Ref Range    WBC Urine None <5 /hpf    RBC Urine 2-5 <5 /hpf    Epithelial Cells UR  0 - 2 /lpf    Mucous Urine None NONE lpf    Casts Urine None NONE /lpf    Crystal Urine None NONE /lpf    Bacteria Wet Prep Moderate None       Assessment and Plan        1. Urinary frequency  UA without signs of infection. Not likely a presentation of DM as no glucose on UA. Patient does drink a lot of tea with caffeine, especially before bed, this may be contributing.   - recommend limiting fluid intake,  especially before bed. And decreasing caffeine intake.    2. Strain of lumbar region, initial encounter  This is likely muscular strain from repetitive lifting and bending. Paraspinal tenderness on exam. 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 No red flag symptoms.  - recommend heat, ice, ibuprofen and limiting aggravating activities.  - encouraged her to stay active.  - if this is not getting better in 2 weeks, recommended that she come back and we can re-evaluate and consider muscle relaxer vs OMT vs PT    3. Depression   Mood good, she feels safe where she is living. 0 on PHQ-9 and STEVEN-7          Medications Discontinued During This Encounter   Medication Reason     AMOXICILLIN PO      AMOXICILLIN PO      IBUPROFEN PO        Options for treatment and follow-up care were reviewed with the patient. Chelsi Saleh  engaged in the decision making process and verbalized understanding of the options discussed and agreed with the final plan.    Estella Sol MD

## 2018-10-29 NOTE — PATIENT INSTRUCTIONS
Here is the plan from today's visit    1. Urinary frequency  Your urine looked normal, there is no infection.  Limit fluid intake, especially before bed.   Limit caffeine intake.    2. Strain of lumbar region, initial encounter  This is likely from repetitive lifting and bending  - try heat, and ice  - ibuprofen  - limit aggravating activities, but stay active!  - if this is not getting better in 2 weeks, come back and we can try a different medications      Please call or return to clinic if your symptoms don't go away.    Follow up plan    Thank you for coming to [facility] today.  Lab Testing:  **If you had lab testing today and your results are reassuring or normal they will be mailed to you or sent through [employer] within 7 days.   **If the lab tests need quick action we will call you with the results.  The phone number we will call with results is # [phone] (home) . If this is not the best number please call our clinic and change the number.  Medication Refills:  If you need any refills please call your pharmacy and they will contact us.   If you need to  your refill at a new pharmacy, please contact the new pharmacy directly. The new pharmacy will help you get your medications transferred faster.   Scheduling:  If you have any concerns about today's visit or wish to schedule another appointment please call our office during normal business hours [phone] (8-5:00 M-F)  If a referral was made to a [facility] and you don't get a call from central scheduling please call [phone].  If a Mammogram was ordered for you at [facility] call [phone] to schedule or change your appointment.  If you had an XRay/CT/Ultrasound/MRI ordered the number is [phone] to schedule or change your radiology appointment.   Medical Concerns:  If you have urgent medical concerns please call [phone] at any time of the day.    Estella Sol MD

## 2018-10-29 NOTE — MR AVS SNAPSHOT
After Visit Summary   10/29/2018    Chelsi Saleh    MRN: [id number]           Patient Information     Date Of Birth          1985        Visit Information        Provider Department      10/29/2018 2:00 PM Estella Sol MD [facility]        Today's Diagnoses     Strain of lumbar region, initial encounter    -  1    Urinary frequency          Care Instructions    Here is the plan from today's visit    1. Urinary frequency  Your urine looked normal, there is no infection.  Limit fluid intake, especially before bed.   Limit caffeine intake.    2. Strain of lumbar region, initial encounter  This is likely from repetitive lifting and bending  - try heat, and ice  - ibuprofen  - limit aggravating activities, but stay active!  - if this is not getting better in 2 weeks, come back and we can try a different medications      Please call or return to clinic if your symptoms don't go away.    Follow up plan    Thank you for coming to [facility] today.  Lab Testing:  **If you had lab testing today and your results are reassuring or normal they will be mailed to you or sent through [employer] within 7 days.   **If the lab tests need quick action we will call you with the results.  The phone number we will call with results is # [phone] (home) . If this is not the best number please call our clinic and change the number.  Medication Refills:  If you need any refills please call your pharmacy and they will contact us.   If you need to  your refill at a new pharmacy, please contact the new pharmacy directly. The new pharmacy will help you get your medications transferred faster.   Scheduling:  If you have any concerns about today's visit or wish to schedule another appointment please call our office during normal business hours [phone] (8-5:00 M-F)  If a referral was made to a [facility] and you don't get a call from central scheduling please call  [phone].  If a Mammogram was ordered for you at [facility] call [phone] to schedule or change your appointment.  If you had an XRay/CT/Ultrasound/MRI ordered the number is [phone] to schedule or change your radiology appointment.   Medical Concerns:  If you have urgent medical concerns please call [phone] at any time of the day.    Estella Sol MD           Follow-ups after your visit        Who to contact     Please call your clinic at [phone] to:    Ask questions about your health    Make or cancel appointments    Discuss your medicines    Learn about your test results    Speak to your doctor            Additional Information About Your Visit        Care EveryWhere ID     This is your Care EveryWhere ID. This could be used by other organizations to access your [place] medical records  [id number]        Your Vitals Were     Pulse Temperature Last Period Pulse Oximetry Breastfeeding? BMI (Body Mass Index)    79 98.2  F (36.8  C) (Oral) 08/15/2018 (Approximate) 100% No 19.42 kg/m2       Blood Pressure from Last 3 Encounters:   10/29/18 130/79   03/09/18 120/87   11/09/16 105/77    Weight from Last 3 Encounters:   10/29/18 124 lb (56.2 kg)   03/09/18 124 lb 12.8 oz (56.6 kg)   11/09/16 132 lb (59.9 kg)              We Performed the Following     Urinalysis, Micro If (UA) ([facility])     Urine Microscopic (UA) ([facility])          Today's Medication Changes          These changes are accurate as of 10/29/18  2:43 PM.  If you have any questions, ask your nurse or doctor.               These medicines have changed or have updated prescriptions.        Dose/Directions    ibuprofen 600 MG tablet   Commonly known as:  ADVIL/MOTRIN   This may have changed:  Another medication with the same name was removed. Continue taking this medication, and follow the directions you see here.   Used for:  Tooth pain   Changed by:  Estella Sol MD        Dose:  600 mg   Take 1 tablet (600 mg) by mouth  every 6 hours as needed for moderate pain   Quantity:  30 tablet   Refills:  3         Stop taking these medicines if you haven't already. Please contact your care team if you have questions.     AMOXICILLIN PO   Stopped by:  Estella Sol MD                    Primary Care Provider Office Phone # Fax #    Dominique Kendall [phone]       [address] [address]        Equal Access to Services     [facility]: Hadii aad ku hadasho Soomaali, waaxda luqadaha, qaybta kaalmada adeegyada, waxay idiin hayaan adeeg kharash la'aan . So [facility] [phone].    ATENCIÓN: Si habla español, tiene a bonner disposición servicios gratuitos de asistencia lingüística. Tori al [phone].    We comply with applicable federal civil rights laws and [place] laws. We do not discriminate on the basis of race, color, national origin, age, disability, sex, sexual orientation, or gender identity.            Thank you!     Thank you for choosing [facility]  for your care. Our goal is always to provide you with excellent care. Hearing back from our patients is one way we can continue to improve our services. Please take a few minutes to complete the written survey that you may receive in the mail after your visit with us. Thank you!             Your Updated Medication List - Protect others around you: Learn how to safely use, store and throw away your medicines at www.disposemymeds.org.          This list is accurate as of 10/29/18  2:43 PM.  Always use your most recent med list.                   Brand Name Dispense Instructions for use Diagnosis    * acetaminophen-codeine 300-30 MG per tablet    TYLENOL #3     Take 1-2 tablets by mouth every 4 hours as needed for moderate pain        * acetaminophen-codeine 300-30 MG per tablet    TYLENOL #3    10 tablet    Take 1 tablet by mouth every 6 hours as needed for pain maximum 4 tablet(s) per day    Tooth pain       ibuprofen  600 MG tablet    ADVIL/MOTRIN    30 tablet    Take 1 tablet (600 mg) by mouth every 6 hours as needed for moderate pain    Tooth pain       * Notice:  This list has 2 medication(s) that are the same as other medications prescribed for you. Read the directions carefully, and ask your doctor or other care provider to review them with you.

## 2018-10-29 NOTE — PROGRESS NOTES
Preceptor Attestation:   Patient seen, evaluated and discussed with the resident. I have verified the content of the note, which accurately reflects my assessment of the patient and the plan of care.   Supervising Physician:  Meredith Grace MD
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
## 2019-01-11 ENCOUNTER — OFFICE VISIT (OUTPATIENT)
Dept: FAMILY MEDICINE | Facility: CLINIC | Age: 34
End: 2019-01-11
Payer: COMMERCIAL

## 2019-01-11 VITALS
OXYGEN SATURATION: 98 % | TEMPERATURE: 98.9 F | DIASTOLIC BLOOD PRESSURE: 75 MMHG | HEART RATE: 79 BPM | BODY MASS INDEX: 19.42 KG/M2 | SYSTOLIC BLOOD PRESSURE: 112 MMHG | RESPIRATION RATE: 16 BRPM | WEIGHT: 124 LBS

## 2019-01-11 DIAGNOSIS — G47.00 INSOMNIA, UNSPECIFIED TYPE: Primary | ICD-10-CM

## 2019-01-11 DIAGNOSIS — F32.A DEPRESSION, UNSPECIFIED DEPRESSION TYPE: ICD-10-CM

## 2019-01-11 DIAGNOSIS — Z91.89 AT RISK FOR VIOLENCE: ICD-10-CM

## 2019-01-11 RX ORDER — TRAZODONE HYDROCHLORIDE 50 MG/1
50 TABLET, FILM COATED ORAL AT BEDTIME
Qty: 5 TABLET | Refills: 0 | Status: SHIPPED | OUTPATIENT
Start: 2019-01-11 | End: 2019-01-18

## 2019-01-11 NOTE — NURSING NOTE
Due to patient being non-English speaking/uses sign language, an  was used for this visit. Only for face-to-face interpretation by an external agency, date and length of interpretation can be found on the scanned worksheet.     name: Id: [id number]  Agency: [employer]  Language: Swedish   Telephone number: n/a  Type of interpretation: Telemedicine, spoken used the Ipad    Katherine Quiñonez CMA
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
## 2019-01-11 NOTE — PROGRESS NOTES
Preceptor Attestation:   Patient seen, evaluated and discussed with the resident. I have verified the content of the note, which accurately reflects my assessment of the patient and the plan of care.   Supervising Physician:  Liz Bello MD

## 2019-01-18 ENCOUNTER — OFFICE VISIT (OUTPATIENT)
Dept: FAMILY MEDICINE | Facility: CLINIC | Age: 34
End: 2019-01-18
Payer: COMMERCIAL

## 2019-01-18 VITALS
BODY MASS INDEX: 19.36 KG/M2 | TEMPERATURE: 98.1 F | WEIGHT: 123.6 LBS | OXYGEN SATURATION: 96 % | HEART RATE: 86 BPM | DIASTOLIC BLOOD PRESSURE: 85 MMHG | SYSTOLIC BLOOD PRESSURE: 122 MMHG

## 2019-01-18 DIAGNOSIS — F51.04 PSYCHOPHYSIOLOGICAL INSOMNIA: ICD-10-CM

## 2019-01-18 DIAGNOSIS — F32.A DEPRESSION, UNSPECIFIED DEPRESSION TYPE: Primary | ICD-10-CM

## 2019-01-18 RX ORDER — TRAZODONE HYDROCHLORIDE 50 MG/1
50 TABLET, FILM COATED ORAL AT BEDTIME
Qty: 30 TABLET | Refills: 3 | Status: SHIPPED | OUTPATIENT
Start: 2019-01-18 | End: 2023-09-25

## 2019-01-18 ASSESSMENT — ENCOUNTER SYMPTOMS
DYSPHORIC MOOD: 1
AGITATION: 0
DECREASED CONCENTRATION: 1
HALLUCINATIONS: 0
SLEEP DISTURBANCE: 1
NERVOUS/ANXIOUS: 1
MYALGIAS: 1
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
## 2019-01-18 NOTE — NURSING NOTE
Due to patient being non-English speaking/uses sign language, an  was used for this visit. Only for face-to-face interpretation by an external agency, date and length of interpretation can be found on the scanned worksheet.     name: Sarah Kelly  Agency: [employer]  Language: Uzbek   Telephone number: [phone]  Type of interpretation: Face-to-face, spoken

## 2019-01-18 NOTE — PATIENT INSTRUCTIONS
Here is the plan from today's visit    - You will come back next Thursday 1/24/19 at 2:00 to see Dr. Jones for a therapy appointment.  - I would like to see you back in 1 month to see how you are doing.    1. Depression, unspecified depression type    - traZODone (DESYREL) 50 MG tablet; Take 1 tablet (50 mg) by mouth At Bedtime  Dispense: 30 tablet; Refill: 3    2. Insomnia, unspecified type    - traZODone (DESYREL) 50 MG tablet; Take 1 tablet (50 mg) by mouth At Bedtime  Dispense: 30 tablet; Refill: 3      Please call or return to clinic if your symptoms don't go away.    Follow up plan  Please make a clinic appointment for follow up with me (IDRIS PARISI) in 1  month for depression follow up.    Thank you for coming to [facility] today.  Lab Testing:  **If you had lab testing today and your results are reassuring or normal they will be mailed to you or sent through PhoneTell within 7 days.   **If the lab tests need quick action we will call you with the results.  The phone number we will call with results is # [phone] (home) . If this is not the best number please call our clinic and change the number.  Medication Refills:  If you need any refills please call your pharmacy and they will contact us.   If you need to  your refill at a new pharmacy, please contact the new pharmacy directly. The new pharmacy will help you get your medications transferred faster.   Scheduling:  If you have any concerns about today's visit or wish to schedule another appointment please call our office during normal business hours [phone] (8-5:00 M-F)  If a referral was made to a [facility] and you don't get a call from central scheduling please call [phone].  If a Mammogram was ordered for you at [facility] call [phone] to schedule or change your appointment.  If you had an XRay/CT/Ultrasound/MRI ordered the number is [phone] to schedule or change your  radiology appointment.   Medical Concerns:  If you have urgent medical concerns please call [phone] at any time of the day.    Iglesia Paris, DO
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
## 2019-01-18 NOTE — PROGRESS NOTES
Preceptor Attestation:   Patient seen, evaluated and discussed with the resident. I have verified the content of the note, which accurately reflects my assessment of the patient and the plan of care.   Supervising Physician:  Luisa Perez MD

## 2019-02-03 PROBLEM — F33.1 MODERATE EPISODE OF RECURRENT MAJOR DEPRESSIVE DISORDER (H): Status: ACTIVE | Noted: 2018-06-14

## 2019-02-06 ENCOUNTER — TELEPHONE (OUTPATIENT)
Dept: PSYCHOLOGY | Facility: CLINIC | Age: 34
End: 2019-02-06

## 2019-02-06 NOTE — TELEPHONE ENCOUNTER
This provider placed an outreach call using Wallisian language line,  ID [phone] to the patient as she did not show for her scheduled appointment today 2/6/2019. Left a message requesting the patient call the clinic and re-schedule if she is interested. One additional outreach call will be placed by this provider. If that attempt is unsuccessful, a letter will be sent. Following that letter, no additional outreach attempts will be made unless contacted by another referring provider.     Jeanette Jones, PhD

## 2019-02-07 ENCOUNTER — TRANSFERRED RECORDS (OUTPATIENT)
Dept: HEALTH INFORMATION MANAGEMENT | Facility: CLINIC | Age: 34
End: 2019-02-07

## 2019-02-22 ENCOUNTER — TELEPHONE (OUTPATIENT)
Dept: FAMILY MEDICINE | Facility: CLINIC | Age: 34
End: 2019-02-22

## 2019-02-22 NOTE — TELEPHONE ENCOUNTER
Patient did not show for appointment this morning.   Placed outreach call using French language line,  #[phone]. No answer. Left message stating that due to frequent no-shows, she would need to talk with me before scheduling another appointment so that we could work on barriers preventing attendance.       Jeanette Jones, PhD  Behavioral Health Fellow

## 2019-03-13 PROBLEM — F43.10 PTSD (POST-TRAUMATIC STRESS DISORDER): Status: ACTIVE | Noted: 2019-03-13

## 2023-09-24 NOTE — PROGRESS NOTES
Assessment & Plan     Encounter for medical examination to establish care  Carpal Tunnel, R wrist  Presentation and physical exam points most likely to carpal tunnel, despite lack of repetitive movements. (Suspect cell phone hand.) Positive phalen test. Fitted her with a brace in the office and patient stated it already felt better. Advised to wear it at night and return if she is still uncomfortable in 6 weeks.    Nocturia  Patient requesting blood sugar testing for diabetes. Does have some mild nocturia. A1C 5.0 today.  - Hemoglobin A1c    Language Barrier  This patient requires an  to complete the visit or speaks English as a second language.  Not speaking the primary language of the healthcare system is a social determinant of health that impacts how they interact with the medical system.      Return in about 6 weeks (around 11/6/2023).    Jelly Holguin DO  [facility] STORMY Gagnon is a 37 year old, presenting for the following health issues:  Musculoskeletal Problem (Pain, currently having pain, 2 weeks. )      HPI     Hand Pain  - 2 weeks  - pain on the pinky side, numbness and tingling on right  - positive   - doesn't work  - right handed  - has been using icy hot            Objective    /78   Pulse 69   Wt 62.6 kg (138 lb 1.6 oz)   SpO2 93%   BMI 21.63 kg/m    Body mass index is 21.63 kg/m .  Physical Exam   Constitutional: No acute distress, sitting comfortably  Eyes: EOMI, no eye discharge, no icterus, injection or swelling.  Ears, nose, mouth, throat: Normocephalic, no nasal drainage, speaks clearly, no lip cracking.   Neck: Normal range of motion  CV: Extremeties well perfused  Respiratory: No audible wheezing, strido, cough, or other respiratory distress. Speaking in full sentences.  GI: Nondistended abdomen  MSK: Positive phalen test, R wrist  Skin: No visible rashes, bruising or other skin lesions.   Neuro: AOx3  Psych: Cooperative, mood, thought  and judgement appropriate to situation.

## 2023-09-25 ENCOUNTER — OFFICE VISIT (OUTPATIENT)
Dept: FAMILY MEDICINE | Facility: CLINIC | Age: 38
End: 2023-09-25
Payer: COMMERCIAL

## 2023-09-25 VITALS
HEART RATE: 69 BPM | DIASTOLIC BLOOD PRESSURE: 78 MMHG | SYSTOLIC BLOOD PRESSURE: 111 MMHG | OXYGEN SATURATION: 93 % | WEIGHT: 138.1 LBS | BODY MASS INDEX: 21.63 KG/M2

## 2023-09-25 DIAGNOSIS — Z75.8 LANGUAGE BARRIER: ICD-10-CM

## 2023-09-25 DIAGNOSIS — Z60.3 LANGUAGE BARRIER: ICD-10-CM

## 2023-09-25 DIAGNOSIS — Z00.00 ENCOUNTER FOR MEDICAL EXAMINATION TO ESTABLISH CARE: Primary | ICD-10-CM

## 2023-09-25 DIAGNOSIS — G56.01 CARPAL TUNNEL SYNDROME OF RIGHT WRIST: ICD-10-CM

## 2023-09-25 DIAGNOSIS — R35.1 NOCTURIA: ICD-10-CM

## 2023-09-25 LAB — HBA1C MFR BLD: 5 % (ref 0–5.6)

## 2023-09-25 PROCEDURE — 36416 COLLJ CAPILLARY BLOOD SPEC: CPT | Performed by: STUDENT IN AN ORGANIZED HEALTH CARE EDUCATION/TRAINING PROGRAM

## 2023-09-25 PROCEDURE — 83036 HEMOGLOBIN GLYCOSYLATED A1C: CPT | Performed by: STUDENT IN AN ORGANIZED HEALTH CARE EDUCATION/TRAINING PROGRAM

## 2023-09-25 PROCEDURE — 99203 OFFICE O/P NEW LOW 30 MIN: CPT | Mod: GC | Performed by: STUDENT IN AN ORGANIZED HEALTH CARE EDUCATION/TRAINING PROGRAM

## 2023-09-25 SDOH — SOCIAL STABILITY - SOCIAL INSECURITY: ACCULTURATION DIFFICULTY: Z60.3

## 2023-09-25 NOTE — COMMUNITY RESOURCES LIST (PATIENT PREFERRED LANGUAGE)
09/25/2023   [facility]  N/A  Yvon julito asablaise lima ama adrienne pa , irmacarlos a daylin robledotawanda raquel lama  ama fidel gardner.  Phone: [phone]   Email: N/A   Address: [address] [place] [id number]   Hours: N/A        Faby Binghamalsocorro gutierrez [facility] utility  1  Ismckenna Perlaqabadka Bulshada ee [place] (CAP-) - Barnaamijka Caawinta Rajeeva Fadia Sheridanose (LIHEAP) [place]: 1.17 miles      [facility]   [address] [place] [address]  Luuqad: Monet  Saacadaha: Isniinta 8:00 AM - 4:30 PM , Talaado 8:00 AM - 7:00 PM , Arbacada 8:00 AM - 4:30 PM , Khamiista 8:00 AM - 7:00 PM , Jimce 8:00 AM - 4:30 PM  Kharashyada: Tristonaash   Phone: [phone] Email: [email or web address] Website: [email or web address]     2  [place] - Hailey Lojaanamegan ee Tomiooyi-galbeed ee angely janeaalada - Barnaamijka Caawinta Degdegga  (EAP) - Utilities [place]: 1.59 miles      [facility], Telefoon/Virtual   [address] [place] [address], [place] [address]  Luuqad: Ashley Healycadamegan: Isniinta - Jimce 8:00 AM - 4:00 PM  Kharashyada: Bilaash   Phone: [phone] Email: [email or web address] Website: [email or web address]          Santos Vizcarra  & Becka Del Rosario       [address]   [address]  [address]   [address]  Geovanna Rene   [phone]  [employer] ka yesika olguin   [phone] (TALK)  Lexi radford ee Consuelo Guzman   [phone] (4-A-Child)  Lexi hernandez Galmaelda   [phone] (HOPE)  [employer]   [phone] (RUNAWAY)  Lexi Castellano   [phone]  Galindo Savage   [phone] (HELP)

## 2023-09-25 NOTE — COMMUNITY RESOURCES LIST (PATIENT PREFERRED LANGUAGE)
09/25/2023   [facility]  N/A  Yvon julito asablaise lima ama adrienne pa , irmacarlos a daylin robledotawanda raquel lama  ama fidel gardner.  Phone: [phone]   Email: N/A   Address: [address] [place] [address]   Hours: N/A        Faby Binghamalsocorro gutierrez [facility] utility  1  Ismckenna Perlaqabadka Bulshada ee Treasure Sutton (CAP-) - Barnaamijka Caawinta Rajeeva Fadia Sheridanose (LIHEAP) Fogaanta: 1.17 miles      [facility]   [address] [place] [address]  Luuqad: Monet  Saacadaha: Isniinta 8:00 AM - 4:30 PM , Talaado 8:00 AM - 7:00 PM , Arbacada 8:00 AM - 4:30 PM , Khamiista 8:00 AM - 7:00 PM , Jimce 8:00 AM - 4:30 PM  Kharashyada: Tristonaash   Phone: [phone] Email: info@"[employer].org Website: https://"[employer].org/     2  Treasure Sutton - Hailey Lojaanamegan ee Tomiooyi-galbeed ee angely janeaalada - Barnaamijka Caawinta Degdegga  (EAP) - Utilities Fogaanta: 1.59 miles      [facility], Telefoon/Virtual   [address] [place] [address], [place] [address]  Luuqad: Ashley Healycadamegan: Isniinta - Jimce 8:00 AM - 4:00 PM  Kharashyada: Bilaash   Phone: [phone] Email: yesenia@[email or web address]. Website: https://www."[employer]./residents#human-services          Santos Vizcarra  & Becka Del Rosario       Pablito Elkins   911  Pablito Almonte   311  Geovanna Rene   [phone]  Lifeline ka yesika olguin   [phone] (TALK)  Lexi radford ee Consuelo Guzman   [phone] (4-A-Child)  Lexi hernandez Galmaelda   [phone] (HOPE)  National Runaway Safeline   [phone] (RUNAWAY)  Lexi Castellano   [phone]  Galindo Savage   [phone] (HELP)

## 2023-09-25 NOTE — PATIENT INSTRUCTIONS
It was great to see you today! Thanks for coming to [facility]!      Here is the plan from today's visit    Wear your brace at night.   Come back in 6 weeks if no improvement.       Thank you for coming to [facility] today.  Lab Testing:  **If you had lab testing today and your results are reassuring or normal they will be mailed to you or sent through [employer] within 7 days.   **If the lab tests need quick action we will call you with the results.  **If you are having labs done on a different day, please call [phone] to schedule at [facility] or [phone] for other [facility] locations. Labs do not offer walk-in appointments.  The phone number we will call with results is # [phone] (home) . If this is not the best number please call our clinic and change the number.    Medication Refills:  If you need any refills please call your pharmacy and they will contact us.   If you need to  your refill at a new pharmacy, please contact the new pharmacy directly. The new pharmacy will help you get your medications transferred faster.       Scheduling, Urgent Medical Concerns (24 hours a day!), or ultrasound [phone] (8-5:00 M-F)    Referrals: If a referral was made to an [facility] specialty provider and you do not get a call from central scheduling, please refer to directions on your visit summary or call our office during normal business hours for assistance.     If a Mammogram was ordered for you at the Breast Center call [phone] to schedule or change your appointment.  If you had an XRay/CT/Ultrasound/MRI ordered the number is [phone] to schedule or change your radiology appointment.       Jelly Holguin, DO  Pronouns: she/her/hers  Resident Physician  [facility]/ [facility]    Department of Family Medicine and Community [facility]

## 2023-09-25 NOTE — LETTER
September 25, 2023      Chelsi Collazo Delfino  [address]   [place] [facility] [address]        Dear ,    We are writing to inform you of your test results.    Your test results fall within the expected range(s) or remain unchanged from previous results.  Please continue with current treatment plan. You do not have high blood sugar or diabetes.    Resulted Orders   Hemoglobin A1c   Result Value Ref Range    Hemoglobin A1C 5.0 0.0 - 5.6 %      Comment:      Normal <5.7%   Prediabetes 5.7-6.4%    Diabetes 6.5% or higher     Note: Adopted from ADA consensus guidelines.       If you have any questions or concerns, please call the clinic at the number listed above.       Sincerely,    Jelly Holguin, DO  Pronouns: she/they  Resident Physician, PGY3  [facility]/ [facility]    Department of Family Medicine and Community [facility]

## 2023-09-25 NOTE — COMMUNITY RESOURCES LIST (ENGLISH)
09/25/2023   [facility] [employer]  N/A  For questions about this resource list or additional care needs, please contact your primary care clinic or care manager.  Phone: [phone]   Email: N/A   Address: [address] [place] [address]   Hours: N/A        Financial Stability       Utility payment assistance  1  Community Action [facility] ([facility]) - Low Income Home Energy Assistance Program (LIHEAP) Distance: 1.17 miles      In-Person   [address] [place] [address]  Language: English  Hours: Mon 8:00 AM - 4:30 PM , Tue 8:00 AM - 7:00 PM , Wed 8:00 AM - 4:30 PM , Thu 8:00 AM - 7:00 PM , Fri 8:00 AM - 4:30 PM  Fees: Free   Phone: [phone]) 612-4459 Email: info@[facility] HospitalCarticipate[facility] Website: https://[facility].[place]town/     2  [facility] - Emergency Assistance Program (EAP) - Utilities Distance: 1.59 miles      In-Person, Phone/Virtual   [address] [place] [address]  Language: American Sign Language, English  Hours: Mon - Fri 8:00 AM - 4:00 PM  Fees: Free   Phone: (570) 639-8692 Email: yesenia@[employer]. Website: https://www.VesuviusCarticipate/residents#human-services          Important Numbers & Websites       Emergency Services   911  [facility] Services   311  Poison Control   ([phone] 923-3992  Suicide Prevention Lifeline   (185) 640-6865 (TALK)  Child Abuse Hotline   (181) 929-6933 (4-A-Child)  Sexual Assault Hotline   (434) 777-5553 (HOPE)  National Runaway Safeline   ([phone] 504-3578 (RUNAWAY)  All-Options Talkline   (415) 118-7362  Substance Abuse Referral   (869) 123-7258 (HELP)

## 2023-09-25 NOTE — COMMUNITY RESOURCES LIST (ENGLISH)
09/25/2023   [facility] [employer]  N/A  For questions about this resource list or additional care needs, please contact your primary care clinic or care manager.  Phone: [phone]   Email: N/A   Address: [address] [place] [address]   Hours: N/A        Financial Stability       Utility payment assistance  1  Community Action [facility] ([facility]) - Low Income Home Energy Assistance Program (LIHEAP) Distance: 1.17 miles      In-Person   [address] [place] [address]  Language: English  Hours: Mon 8:00 AM - 4:30 PM , Tue 8:00 AM - 7:00 PM , Wed 8:00 AM - 4:30 PM , Thu 8:00 AM - 7:00 PM , Fri 8:00 AM - 4:30 PM  Fees: Free   Phone: (224) 270-6673 Email: info@[facility] Hospital[facility] Inc[facility] Website: https://[facility].Appfrica/     2  [facility] - Emergency Assistance Program (EAP) - Utilities Distance: 1.59 miles      In-Person, Phone/Virtual   [address] [place] [address]  Language: American Sign Language, English  Hours: Mon - Fri 8:00 AM - 4:00 PM  Fees: Free   Phone: (483) 547-6290 Email: yesenia@[place]. Website: https://www.[facility]Integrated Systems Inc./residents#human-services          Important Numbers & Websites       Emergency Services   911  [facility] Services   311  Poison Control   (862) 139-2214  Suicide Prevention Lifeline   (377) 147-3716 (TALK)  Child Abuse Hotline   (119) 955-7880 (4-A-Child)  Sexual Assault Hotline   (327) 420-1322 (HOPE)  National Runaway Safeline   (540) 795-1481 (RUNAWAY)  All-Options Talkline   (499) 697-2300  Substance Abuse Referral   [phone] [phone] (HELP)

## 2023-09-25 NOTE — COMMUNITY RESOURCES LIST (PATIENT PREFERRED LANGUAGE)
09/25/2023   [facility]  N/A  Yvon julito asablaise lima ama adrienne pa , irmacarlos a daylin robledotawanda raquel lama  ama fidel gardner.  Phone: [phone]   Email: N/A   Address: [address] [place] [address]   Hours: N/A        Faby Binghamalsocorro gutierrez [facility] utility  1  Ismckenna Perlaqabadka Bulshada ee Treasure Sutton (CAP-) - Barnaamijka Caawinta Rajeeva Fadia Sheridanose (LIHEAP) Fogaanta: 1.17 miles      [facility]   [address] [place] [address]  Luuqad: Monet  Saacadaha: Isniinta 8:00 AM - 4:30 PM , Talaado 8:00 AM - 7:00 PM , Arbacada 8:00 AM - 4:30 PM , Khamiista 8:00 AM - 7:00 PM , Jimce 8:00 AM - 4:30 PM  Kharashyada: Tristonaash   Phone: [phone] 728-8168 Email: info@[employer].org Website: https://[employer].org/     2  Treasure Sutton - Hailey Ljoaanamegan ee Tomiooyi-galbeed ee angely janeaalada - Barnaamijka Caawinta Degdegga  (EAP) - Utilities Fogaanta: 1.59 miles      [facility], Telefoon/Virtual   [address] [place] [address], [place] [address]  Luuqad: Ashley Healycadamegan: Isniinta - Jimce 8:00 AM - 4:00 PM  Kharashyada: Bilaash   Phone: (634) [phone] Email: yesenia@[email or web address]. Website: https://www.[employer]iveshoes./residents#human-services          Santos Vizcarra  & Becka Del Rosario       Pablito Elkins   911  [address]   311  Geovanna Rene   [phone]  Lifeline ka yesika olguin   [phone] (TALK)  Lexi radford ee Consuelo Guzman   [phone] (4-A-Child)  Lexi hernandez Galmaelda   [phone] (HOPE)  National Runaway Safeline   ([phone] (RUNAWAY)  Lexi Castellano   [phone]  Galindo Savage   [phone] (HELP)

## 2023-09-25 NOTE — COMMUNITY RESOURCES LIST (ENGLISH)
09/25/2023   [facility] [employer]  N/A  For questions about this resource list or additional care needs, please contact your primary care clinic or care manager.  Phone: [phone]   Email: N/A   Address: [address] [place] [address]   Hours: N/A        Financial Stability       Utility payment assistance  1  Community Action [facility]) - Low Income Home Energy Assistance Program (LIHEAP) Distance: 1.17 miles      In-Person   [address] [place] [address]  Language: English  Hours: Mon 8:00 AM - 4:30 PM , Tue 8:00 AM - 7:00 PM , Wed 8:00 AM - 4:30 PM , Thu 8:00 AM - 7:00 PM , Fri 8:00 AM - 4:30 PM  Fees: Free   Phone: [phone]) 321-1123 Email: info@[facility] HospitalKrow[facility]Pad[facility] Website: https://[facility].Cloudnexa/     2  [facility] - Emergency Assistance Program (EAP) - Utilities Distance: 1.59 miles      In-Person, Phone/Virtual   [address] [place] [address]  Language: American Sign Language, English  Hours: Mon - Fri 8:00 AM - 4:00 PM  Fees: Free   Phone: (571) 475-3878 Email: yesenia@[email or web address]. Website: https://www.SciotaKrowdPad/residents#human-services          Important Numbers & Websites       Emergency Services   911  [facility] Services   311  Poison Control   (153) 762-4478  Suicide Prevention Lifeline   (701) 551-2232 (TALK)  Child Abuse Hotline   (625) 977-5049 (4-A-Child)  Sexual Assault Hotline   (841) 488-1412 (HOPE)  National Runaway Safeline   (356) 945-9730 (RUNAWAY)  All-Options Talkline   (408) 242-3531  Substance Abuse Referral   (545) [phone] (HELP)

## 2023-09-25 NOTE — COMMUNITY RESOURCES LIST (PATIENT PREFERRED LANGUAGE)
09/25/2023   [facility]  N/A  Yvon julito asablaise lima ama adrienne pa , irmacarlos a daylin robledotawanda raquel lama  ama fidel gardner.  Phone: [phone]   Email: N/A   Address: [address] [place] [address]   Hours: N/A        Faby Binghamalsocorro gutierrez [facility] utility  1  Ismckenna Perlaqabadka Bulshada ee Treasure Sutton (CAP-) - Barnaamijka Caawinta Rajeeva Fadia Sheridanose (LIHEAP) Fogaanta: 1.17 miles      [facility]   [address] [place] [address]  Luuqad: Monet  Saacadaha: Isniinta 8:00 AM - 4:30 PM , Talaado 8:00 AM - 7:00 PM , Arbacada 8:00 AM - 4:30 PM , Khamiista 8:00 AM - 7:00 PM , Jimce 8:00 AM - 4:30 PM  Kharashyada: Tristonaash   Phone: ([phone] Email: info@[employer].org Website: https://[employer].org/     2  Treasure Sutton - Hailey Lojaanamegan ee Tomiooyi-galbeed ee angely janeaalada - Barnaamijka Caawinta Degdegga  (EAP) - Utilities Fogaanta: 1.59 miles      [facility], Telefoon/Virtual   [address] [place] [address], [place] [address]  Luuqad: Ashley Healycadamegan: Isniinta - Jimce 8:00 AM - 4:00 PM  Kharashyada: Bilaash   Phone: ([phone] Email: yesenia@[place]. Website: https://www.[employer]./residents#human-services          Santos Vizcarra  & Becka Del Rosario       Pablito Elkins   911  Pablito Almonte   311  Geovanna Rene   [phone]  Lifeline ka yesika olguin   [phone] (TALK)  Lexi radford ee Consuelo Guzman   [phone] (4-A-Child)  Lexi hernandez Galmaelda   [phone] (HOPE)  National Runaway Safeline   ([phone] (RUNAWAY)  Lexi Castellano   [phone]  Galindo Savage   [phone] (HELP)

## 2023-09-25 NOTE — COMMUNITY RESOURCES LIST (ENGLISH)
09/25/2023   [facility] [employer]  N/A  For questions about this resource list or additional care needs, please contact your primary care clinic or care manager.  Phone: [phone]   Email: N/A   Address: [address] [place] [address]   Hours: N/A        Financial Stability       Utility payment assistance  1  Community Action [facility] ([facility]) - Low Income Home Energy Assistance Program (LIHEAP) Distance: 1.17 miles      In-Person   [address] [place] [address]  Language: English  Hours: Mon 8:00 AM - 4:30 PM , Tue 8:00 AM - 7:00 PM , Wed 8:00 AM - 4:30 PM , Thu 8:00 AM - 7:00 PM , Fri 8:00 AM - 4:30 PM  Fees: Free   Phone: (850) 261-6978 Email: info@[facility] Website: https://[facility].[place]ge/     2  [facility] - Emergency Assistance Program (EAP) - Utilities Distance: 1.59 miles      In-Person, Phone/Virtual   [address] [place] [address]  Language: American Sign Language, English  Hours: Mon - Fri 8:00 AM - 4:00 PM  Fees: Free   Phone: (959) 554-6675 Email: yesenia@[place]. Website: https://www.[place] SpringsQuietyme/residents#human-services          Important Numbers & Websites       Emergency Services   911  [facility] Services   311  Poison Control   (658) 271-7095  Suicide Prevention Lifeline   (807) 202-3630 (TALK)  Child Abuse Hotline   (854) 484-1739 (4-A-Child)  Sexual Assault Hotline   (702) 694-1800 (HOPE)  National Runaway Safeline   (203) 279-4289 (RUNAWAY)  All-Options Talkline   (537) 536-8333  Substance Abuse Referral   (795) 461-1694 (HELP)

## 2023-09-26 NOTE — PROGRESS NOTES
Preceptor Attestation:   Patient seen, evaluated and discussed with the resident. I have verified the content of the note, which accurately reflects my assessment of the patient and the plan of care.   Supervising Physician:  Oly Walton, DO